# Patient Record
Sex: MALE | Race: WHITE | NOT HISPANIC OR LATINO | Employment: FULL TIME | URBAN - METROPOLITAN AREA
[De-identification: names, ages, dates, MRNs, and addresses within clinical notes are randomized per-mention and may not be internally consistent; named-entity substitution may affect disease eponyms.]

---
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
## 2024-01-12 ENCOUNTER — CLINICAL SUPPORT (OUTPATIENT)
Dept: FAMILY MEDICINE | Facility: CLINIC | Age: 19
End: 2024-01-12
Payer: COMMERCIAL

## 2024-01-12 VITALS — TEMPERATURE: 98 F

## 2024-01-12 DIAGNOSIS — Z23 INFLUENZA VACCINE ADMINISTERED: Primary | ICD-10-CM

## 2024-01-12 PROCEDURE — 90686 IIV4 VACC NO PRSV 0.5 ML IM: CPT | Mod: ,,, | Performed by: NURSE PRACTITIONER

## 2024-01-12 PROCEDURE — 90471 IMMUNIZATION ADMIN: CPT | Mod: ,,, | Performed by: NURSE PRACTITIONER

## 2024-01-12 PROCEDURE — 99499 UNLISTED E&M SERVICE: CPT | Mod: ,,, | Performed by: NURSE PRACTITIONER

## 2025-02-24 ENCOUNTER — OFFICE VISIT (OUTPATIENT)
Dept: PEDIATRICS | Facility: CLINIC | Age: 20
End: 2025-02-24
Payer: COMMERCIAL

## 2025-02-24 ENCOUNTER — TELEPHONE (OUTPATIENT)
Dept: PEDIATRICS | Facility: CLINIC | Age: 20
End: 2025-02-24

## 2025-02-24 VITALS
DIASTOLIC BLOOD PRESSURE: 68 MMHG | BODY MASS INDEX: 22.05 KG/M2 | HEART RATE: 71 BPM | OXYGEN SATURATION: 98 % | HEIGHT: 68 IN | SYSTOLIC BLOOD PRESSURE: 116 MMHG | WEIGHT: 145.5 LBS | TEMPERATURE: 99 F

## 2025-02-24 DIAGNOSIS — R05.1 ACUTE COUGH: ICD-10-CM

## 2025-02-24 DIAGNOSIS — B00.1 COLD SORE: ICD-10-CM

## 2025-02-24 DIAGNOSIS — J10.1 INFLUENZA A: Primary | ICD-10-CM

## 2025-02-24 PROCEDURE — 99214 OFFICE O/P EST MOD 30 MIN: CPT | Mod: ,,, | Performed by: PEDIATRICS

## 2025-02-24 RX ORDER — OSELTAMIVIR PHOSPHATE 75 MG/1
75 CAPSULE ORAL 2 TIMES DAILY
COMMUNITY
Start: 2025-02-21

## 2025-02-24 RX ORDER — DOCOSANOL 100 MG/G
1 CREAM TOPICAL 3 TIMES DAILY
Qty: 2 G | Refills: 0 | Status: SHIPPED | OUTPATIENT
Start: 2025-02-24 | End: 2025-03-03

## 2025-02-24 RX ORDER — DEXTROMETHORPHAN HYDROBROMIDE, GUAIFENESIN, PHENYLEPHRINE HYDROCHLORIDE 17.5; 400; 1 MG/1; MG/1; MG/1
1 TABLET ORAL 2 TIMES DAILY
Qty: 20 TABLET | Refills: 0 | Status: SHIPPED | OUTPATIENT
Start: 2025-02-24 | End: 2025-03-06

## 2025-02-24 NOTE — PROGRESS NOTES
"SUBJECTIVE:  You Silvestre is a 19 y.o. male here alone for Chest Congestion, Cough, and Influenza      HPI  19 y.o. male presents to clinic today for chest congestion, cough and influenza positive x 4 days. Patient reports going to an urgent care in [place] on 2/21/25, which was tested positive for Flu. Patient states that he was unable to  the Tamiflu prescribed to him, due to the pharmacy closing and then came back home. Patient reports chest congestion has not improved. Pt states his ears feel like they have fluid in them. Pt denies smoking or vaping. Pt reports running a low grade fever over the weekend. Pts last known fever was on Saturday. Pt reports not taking any medicine for symptoms.     You's allergies, medications, history, and problem list were updated as appropriate.    Review of Systems   Constitutional:  Positive for fever.   HENT:  Positive for congestion and rhinorrhea.    Respiratory:  Positive for cough.    Neurological:  Positive for headaches.      A comprehensive review of symptoms was completed and negative except as noted above.    OBJECTIVE:  Vital signs  Vitals:    02/24/25 1036   BP: 116/68   BP Location: Right arm   Patient Position: Sitting   Pulse: 71   Temp: 99 °F (37.2 °C)   TempSrc: Oral   SpO2: 98%   Weight: 66 kg (145 lb 8 oz)   Height: 5' 8.07" (1.729 m)      Wt Readings from Last 5 Encounters:   02/24/25 66 kg (145 lb 8 oz)   01/03/22 67.1 kg (148 lb 0.6 oz)   ]  Physical Exam  Vitals and nursing note reviewed. Exam conducted with a chaperone present.   Constitutional:       Appearance: Normal appearance.   HENT:      Right Ear: Tympanic membrane, ear canal and external ear normal.      Left Ear: Tympanic membrane, ear canal and external ear normal.      Nose: Congestion present.      Comments: Erythematous mucosa      Mouth/Throat:      Mouth: Mucous membranes are moist.      Pharynx: Oropharynx is clear. No oropharyngeal exudate or posterior oropharyngeal " erythema.      Comments: 2 vesicles on the lower lip  Eyes:      Extraocular Movements: Extraocular movements intact.      Conjunctiva/sclera: Conjunctivae normal.      Pupils: Pupils are equal, round, and reactive to light.   Cardiovascular:      Rate and Rhythm: Normal rate and regular rhythm.      Pulses: Normal pulses.      Heart sounds: Normal heart sounds. No murmur heard.     No friction rub. No gallop.   Pulmonary:      Effort: Pulmonary effort is normal.      Breath sounds: Normal breath sounds. No wheezing or rales.   Abdominal:      General: Abdomen is flat.      Palpations: Abdomen is soft.      Tenderness: There is no abdominal tenderness.   Musculoskeletal:         General: Normal range of motion.      Cervical back: Normal range of motion and neck supple.   Skin:     General: Skin is warm and dry.      Findings: No rash.   Neurological:      General: No focal deficit present.      Mental Status: He is alert and oriented to person, place, and time.   Psychiatric:         Mood and Affect: Mood normal.          No visits with results within 1 Day(s) from this visit.   Latest known visit with results is:   Historical on 09/21/2022   Component Date Value Ref Range Status    Glucose, UA 01/03/2022 Negative   Final    Bilirubin, POC 01/03/2022 Small   Final    Ketones, UA 01/03/2022 Trace   Final    Color, UA 01/03/2022 Dark yellow   Final    Clarity, UA 01/03/2022 Cloudy   Final    Spec Grav UA 01/03/2022 1.030   Final    Blood, UA 01/03/2022 Negative   Final    pH, UA 01/03/2022 7   Final    Protein, POC 01/03/2022 2+ (100 mg/dl)   Final    Urobilinogen, UA 01/03/2022 0.2 mg/dl   Final    Nitrite, UA 01/03/2022 Negative   Final    Leukocytes, UA 01/03/2022 Negative   Final        Health Maintenance Due   Topic Date Due    Hepatitis C Screening  Never done    Lipid Panel  Never done    HIV Screening  Never done    Influenza Vaccine (1) 09/01/2024    COVID-19 Vaccine (3 - 2024-25 season) 09/01/2024         ASSESSMENT/PLAN:  You was seen today for chest congestion, cough and influenza.    Diagnoses and all orders for this visit:    Influenza A    Acute cough  -     phenylephrine-DM-guaiFENesin (DECONEX DMX) 10-17.5-400 mg Tab; Take 1 tablet by mouth 2 (two) times a day. for 10 days    Cold sore  -     docosanoL (ABREVA) 10 % Crea; Apply 1 Application topically 3 (three) times daily. for 7 days           No results found for this or any previous visit (from the past 24 hours).    Follow Up:  Follow up if symptoms worsen or fail to improve.    GENERAL HOME INSTRUCTIONS:    If you/your child is sick and not improved in the next 48 hours, please call our office directly at [phone].  Alternatively, you can send a non-urgent message to us via [facility].      For afterhours questions or advice, please do not hesitate to call our number [phone].

## 2025-02-24 NOTE — TELEPHONE ENCOUNTER
----- Message from Sonia sent at 2/24/2025  8:41 AM CST -----  Regarding: phone message  [email or web address]< called,[phone], pt went to  in [place], positive for flu, treating with Tamiflu,Tylenol & Motrin. Cough is not in his chest